# Patient Record
Sex: MALE | Race: WHITE | HISPANIC OR LATINO | URBAN - METROPOLITAN AREA
[De-identification: names, ages, dates, MRNs, and addresses within clinical notes are randomized per-mention and may not be internally consistent; named-entity substitution may affect disease eponyms.]

---

## 2017-02-22 ENCOUNTER — HOSPITAL ENCOUNTER (EMERGENCY)
Facility: HOSPITAL | Age: 42
Discharge: HOME | End: 2017-02-23
Attending: EMERGENCY MEDICINE
Payer: COMMERCIAL

## 2017-02-22 VITALS
WEIGHT: 165 LBS | RESPIRATION RATE: 18 BRPM | SYSTOLIC BLOOD PRESSURE: 118 MMHG | HEART RATE: 89 BPM | HEIGHT: 66 IN | TEMPERATURE: 99 F | BODY MASS INDEX: 26.52 KG/M2 | OXYGEN SATURATION: 99 % | DIASTOLIC BLOOD PRESSURE: 72 MMHG

## 2017-02-22 DIAGNOSIS — M54.50 ACUTE BILATERAL LOW BACK PAIN WITHOUT SCIATICA: ICD-10-CM
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
## 2017-02-22 PROCEDURE — 63600175 PHARM REV CODE 636 W HCPCS: Performed by: PHYSICIAN ASSISTANT

## 2017-02-22 PROCEDURE — 99283 EMERGENCY DEPT VISIT LOW MDM: CPT | Mod: 25

## 2017-02-22 PROCEDURE — 96372 THER/PROPH/DIAG INJ SC/IM: CPT

## 2017-02-22 PROCEDURE — 25000003 PHARM REV CODE 250: Performed by: PHYSICIAN ASSISTANT

## 2017-02-22 RX ORDER — KETOROLAC TROMETHAMINE 30 MG/ML
10 INJECTION, SOLUTION INTRAMUSCULAR; INTRAVENOUS
Status: COMPLETED | OUTPATIENT
Start: 2017-02-22 | End: 2017-02-22

## 2017-02-22 RX ORDER — METHOCARBAMOL 500 MG/1
500 TABLET, FILM COATED ORAL
Status: COMPLETED | OUTPATIENT
Start: 2017-02-22 | End: 2017-02-22

## 2017-02-22 RX ORDER — CYCLOBENZAPRINE HCL 10 MG
10 TABLET ORAL 3 TIMES DAILY PRN
Qty: 15 TABLET | Refills: 0 | Status: SHIPPED | OUTPATIENT
Start: 2017-02-22 | End: 2017-02-27

## 2017-02-22 RX ORDER — IBUPROFEN 800 MG/1
800 TABLET ORAL EVERY 6 HOURS PRN
Qty: 20 TABLET | Refills: 0 | Status: SHIPPED | OUTPATIENT
Start: 2017-02-22

## 2017-02-22 RX ADMIN — METHOCARBAMOL 500 MG: 500 TABLET ORAL at 11:02

## 2017-02-22 RX ADMIN — KETOROLAC TROMETHAMINE 10 MG: 30 INJECTION, SOLUTION INTRAMUSCULAR at 11:02
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
## 2017-02-23 NOTE — DISCHARGE INSTRUCTIONS
Cómo aliviar el dolor de espalda  El dolor de espalda es un problema común. Los músculos de la espalda se pueden tensionar si usted levanta demasiado peso o simplemente si hace un mal movimiento. Al estar tensionados pueden molestar, e incluso doler, y demorarse semanas en sanar. Para sentirse mejor y evitar que se vuelvan a tensionar, pruebe estas sugerencias:  Nota importante: No les dé aspirina a los niños ni a los adolescentes.        ? Hielo  El hielo disminuye el dolor y la hinchazón. Es más efectivo dina las primeras 24 a 48 horas después de la lesión.  · Envuelva nash bolsa de hielo o de verduras congeladas en nash toalla. (Nunca coloque el hielo directamente sobre la piel.)  · Coloque el hielo sobre la parte de la espalda que más le duele.  · No se ponga hielo dina más de 20 minutos cada vez.  · Deberá usar hielo varias veces al día.  ? Medicamentos  Los calmantes del dolor que se venden sin receta esvin la aspirina, el acetaminofeno y el ibuprofeno, pueden ayudarle a sentir menos molestias y algunos pueden disminuir la hinchazón.  · Dígale a bacon médico qué medicamentos está tomando.  · [place] medicamentos solamente esvin le indiquen.  ? Calor  Después de las primeras 48 horas, el calor puede relajar los músculos adoloridos y mejorar el flujo de purnima.  · Pruebe a bañarse o ducharse con agua tibia. También puede usar nash almohadilla térmica graduada en bajo. Para evitar quemarse, mantenga un paño entre bacon piel y la almohadilla térmica.  · No use nash almohadilla térmica dina más de 15 minutos cada vez y nunca duerma sobre bill.  Date Last Reviewed: 9/1/2015 © 2000-2016 [employer]. [address] [place] [address], [place] [address]. Todos los derechos reservados. Esta información no pretende sustituir la atención médica profesional. Sólo bacon médico puede diagnosticar y tratar un problema de samra.
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
